# Patient Record
Sex: MALE | Race: WHITE | NOT HISPANIC OR LATINO | Employment: FULL TIME | URBAN - METROPOLITAN AREA
[De-identification: names, ages, dates, MRNs, and addresses within clinical notes are randomized per-mention and may not be internally consistent; named-entity substitution may affect disease eponyms.]

---

## 2019-06-26 ENCOUNTER — HOSPITAL ENCOUNTER (EMERGENCY)
Facility: HOSPITAL | Age: 64
Discharge: HOME | End: 2019-06-26
Attending: EMERGENCY MEDICINE
Payer: COMMERCIAL

## 2019-06-26 VITALS
OXYGEN SATURATION: 97 % | RESPIRATION RATE: 18 BRPM | HEART RATE: 64 BPM | TEMPERATURE: 98.2 F | DIASTOLIC BLOOD PRESSURE: 106 MMHG | SYSTOLIC BLOOD PRESSURE: 161 MMHG | WEIGHT: 187.39 LBS

## 2019-06-26 DIAGNOSIS — T14.8XXA SURGICAL WOUND PRESENT: Primary | ICD-10-CM

## 2019-06-26 PROCEDURE — 99282 EMERGENCY DEPT VISIT SF MDM: CPT | Performed by: PHYSICIAN ASSISTANT

## 2019-06-26 PROCEDURE — 99281 EMR DPT VST MAYX REQ PHY/QHP: CPT

## 2019-06-26 NOTE — CONSULTS
Left long trigger finger release 6 days ago  Wound  minor amount  Some erythema  Will heal by itself    Plan: Doxycycline 100 mg bid  Recheck office 48h  Continue ot

## 2019-06-26 NOTE — ED NOTES
Pt was seen by dr TERRELL PANIAGUA [facility] oral discharge instructions given by TERRELL PANIAGUA [facility] MD Sandy Sandoval RN  06/26/19 6075

## 2019-06-26 NOTE — ED PROVIDER NOTES
History  Chief Complaint   Patient presents with    Suture / Staple Removal     here for suture recheck by Dr Abilio Rangel, sutures to right hand     Patient is a 72-year-old male who presents today with a chief complaint of suture recheck after trigger release surgery performed by Dr Abilio Rangel  Patient reports he called the surgeon who reported the patient should present to the emergency department at [facility] where he could be evaluated by the surgeon after he completed surgery  Patient reports he has had no fevers or chills, purulent drainage or pain in the area  Patient reports he popped a stitch yesterday at hand therapy and presented for evaluation  The patient was seen, evaluated and discharged by Dr Abilio Rangel          None       Past Medical History:   Diagnosis Date    Diabetes mellitus ([facility] [address] )     Hypertension        Past Surgical History:   Procedure Laterality Date    CARDIAC SURGERY      HAND SURGERY         History reviewed  No pertinent family history  I have reviewed and agree with the history as documented  Social History     Tobacco Use    Smoking status: Never Smoker    Smokeless tobacco: Never Used   Substance Use Topics    Alcohol use: Not Currently    Drug use: Not Currently        Review of Systems   Constitutional: Negative for chills, fatigue and fever  HENT: Negative for congestion, ear pain, rhinorrhea and sore throat  Eyes: Negative for redness  Respiratory: Negative for chest tightness and shortness of breath  Cardiovascular: Negative for chest pain and palpitations  Gastrointestinal: Negative for abdominal pain, nausea and vomiting  Genitourinary: Negative for dysuria and hematuria  Musculoskeletal: Negative  Skin: Positive for wound  Negative for rash  Neurological: Negative for dizziness, syncope, light-headedness and numbness  Physical Exam  Physical Exam   Constitutional: He is oriented to person, place, and time   He appears well-developed and well-nourished  HENT:   Head: Normocephalic and atraumatic  Eyes: Pupils are equal, round, and reactive to light  Neck: Normal range of motion  Cardiovascular: Normal rate, regular rhythm and normal heart sounds  Pulmonary/Chest: Effort normal and breath sounds normal    Abdominal: Soft  There is no tenderness  There is no guarding  Musculoskeletal: He exhibits tenderness  Hands:  Lymphadenopathy:     He has no cervical adenopathy  Neurological: He is alert and oriented to person, place, and time  Skin: Skin is warm and dry  Capillary refill takes less than 2 seconds  Psychiatric: He has a normal mood and affect  Nursing note and vitals reviewed  Vital Signs  ED Triage Vitals [06/26/19 1240]   Temperature Pulse Respirations Blood Pressure SpO2   98 2 °F (36 8 °C) 64 18 (!) 161/106 97 %      Temp Source Heart Rate Source Patient Position - Orthostatic VS BP Location FiO2 (%)   Tympanic Monitor Sitting Left arm --      Pain Score       --           Vitals:    06/26/19 1240   BP: (!) 161/106   Pulse: 64   Patient Position - Orthostatic VS: Sitting         Visual Acuity      ED Medications  Medications - No data to display    Diagnostic Studies  Results Reviewed     None                 No orders to display              Procedures  Procedures       ED Course  ED Course as of Jun 26 1337   Wed Jun 26, 2019   1319 Dr Kurt valdez, in surgery at this time will see patient once surgery is completed  MDM    Disposition  Final diagnoses:   Surgical wound present     Time reflects when diagnosis was documented in both MDM as applicable and the Disposition within this note     Time User Action Codes Description Comment    6/26/2019  1:36 PM Stevo Pedraza, [address]  8XXA] Surgical wound present       ED Disposition     ED Disposition Condition Date/Time Comment    Discharge  Wed Jun 26, 2019  1:31 PM [address] [place] [address] discharge to home/self care  Follow-up Information     Follow up With Specialties Details Why Vinay Osorio MD Plastic Surgery Go in 1 day  [address]  [facility] [address]  [phone]            There are no discharge medications for this patient  No discharge procedures on file      ED Provider  Electronically Signed by           Gloria Priest PA-C  06/26/19 9020